# Patient Record
Sex: MALE | Race: WHITE
[De-identification: names, ages, dates, MRNs, and addresses within clinical notes are randomized per-mention and may not be internally consistent; named-entity substitution may affect disease eponyms.]

---

## 2020-01-27 ENCOUNTER — HOSPITAL ENCOUNTER (OUTPATIENT)
Dept: HOSPITAL 9 - MADLABBHPM | Age: 60
Discharge: HOME | End: 2020-01-27
Attending: FAMILY MEDICINE
Payer: COMMERCIAL

## 2020-01-27 DIAGNOSIS — N50.89: Primary | ICD-10-CM

## 2020-01-27 DIAGNOSIS — N50.3: ICD-10-CM

## 2020-01-27 PROCEDURE — 76870 US EXAM SCROTUM: CPT

## 2020-01-27 PROCEDURE — 93976 VASCULAR STUDY: CPT

## 2020-01-27 PROCEDURE — 81001 URINALYSIS AUTO W/SCOPE: CPT

## 2020-01-27 NOTE — ULT
SCROTAL ULTRASOUND:

 

Date:  01/27/2020

 

INDICATION:

Right side scrotal mass x5 months. 

 

FINDINGS:

Both testicles have a homogeneous echotexture and appear symmetric. Both testicles exhibit normal and
 symmetric blood flow with color Doppler and spectral analysis evaluation. No evidence of hydrocele. 


 

There is a large right epididymal cyst measuring up to 2.0 cm, which would correspond to the palpable
 abnormality. 

 

IMPRESSION: 

Large right epididymal cyst measuring up to 2.0 cm. 

 

 

POS: SHAKIR

## 2020-11-04 ENCOUNTER — HOSPITAL ENCOUNTER (OUTPATIENT)
Dept: HOSPITAL 9 - MADLABBHPM | Age: 60
Discharge: HOME | End: 2020-11-04
Attending: FAMILY MEDICINE
Payer: COMMERCIAL

## 2020-11-04 DIAGNOSIS — R00.2: Primary | ICD-10-CM

## 2020-11-04 PROCEDURE — 93005 ELECTROCARDIOGRAM TRACING: CPT

## 2020-11-04 PROCEDURE — 93010 ELECTROCARDIOGRAM REPORT: CPT
